# Patient Record
Sex: FEMALE | Race: WHITE | NOT HISPANIC OR LATINO | URBAN - METROPOLITAN AREA
[De-identification: names, ages, dates, MRNs, and addresses within clinical notes are randomized per-mention and may not be internally consistent; named-entity substitution may affect disease eponyms.]

---

## 2018-02-06 ENCOUNTER — OFFICE VISIT CONVERTED (OUTPATIENT)
Dept: SURGERY | Facility: CLINIC | Age: 68
End: 2018-02-06
Attending: NURSE PRACTITIONER

## 2021-03-22 ENCOUNTER — OFFICE VISIT CONVERTED (OUTPATIENT)
Dept: PODIATRY | Facility: CLINIC | Age: 71
End: 2021-03-22
Attending: PODIATRIST

## 2021-04-07 ENCOUNTER — OFFICE VISIT CONVERTED (OUTPATIENT)
Dept: PODIATRY | Facility: CLINIC | Age: 71
End: 2021-04-07
Attending: PODIATRIST

## 2021-05-10 NOTE — H&P
History and Physical      Patient Name: Lavonne Desouza   Patient ID: [id number]   Sex: Female   YOB: 1950    Primary Care Provider: Duane Santiago   Referring Provider: Duane Santiago    Visit Date: March 22, 2021    Provider: Kenny Reyes DPM   Location: [facility]   Location Address: [address] [place] [address]  [id number]   Location Phone: [phone]          Chief Complaint  · Right Foot Pain      History Of Present Illness  Lavonne Desouza is a 70 year old /White female who presents to the [facility] today new patient referred from Duane Santiago.      New, Established, New Problem:  new  Location:  Right heel  Duration:  Fall 2020  Onset:  gradual  Nature:  achy, sharp, shooting  Stable, worsening, improving:  worsening  Aggravating factors:    Patient describes morning Right heel pain as stabbing, burning, or aching. This pain usually subsides throughout the day, however it returns afterperiods of rest andsitting, when standing back up on their feet,and again the next morning.    Previous Treatment:  cortisone injection by PCP    Patient denies any fevers, chills, nausea, vomiting, shortness of breathe, nor any other constitutional signs nor symptoms.    Patient states that they work as an  for [employer].         Past Medical History  Allergic rhinitis, chronic; Bladder disorder; Heel pain, bilateral; Heel spur; Hyperthyroidism; Hypothyroidism         Past Surgical History  Appendectomy; Back surgery; cardiac stents; Gallbladder; Hernia; Hysterectomy-Abdominal; Parathyroidectomy         Medication List  amlodipine 2.5 mg oral tablet; aspirin 81 mg oral tablet,chewable; atorvastatin 40 mg oral tablet; clopidogrel 75 mg oral tablet; esomeprazole magnesium 40 mg oral capsule,delayed release(DR/EC); melatonin 10 mg oral tablet; ranolazine 500 mg oral tablet extended release 12 hr; Vitamin D3 50 mcg (2,000 unit) oral  "capsule         Allergy List  Codeine Sulfate; Egg allergy       Allergies Reconciled  Family Medical History  Heart Disease; Diabetes, unspecified type; Prostate cancer         Social History  Alcohol (Current some day); Caffeine (Current every day); Second hand smoke exposure (Current some day); Tobacco (Current some day)         Review of Systems  · Constitutional  o Denies  o : fatigue, night sweats  · Eyes  o Denies  o : double vision, blurred vision  · HENT  o Denies  o : vertigo, recent head injury  · Cardiovascular  o Denies  o : chest pain, irregular heart beats  · Respiratory  o Denies  o : shortness of breath, productive cough  · Gastrointestinal  o Denies  o : nausea, vomiting  · Genitourinary  o Denies  o : dysuria, urinary retention  · Integument  o Denies  o : hair growth change, new skin lesions  · Neurologic  o Denies  o : altered mental status, seizures  · Musculoskeletal  o * See HPI  · Endocrine  o Denies  o : cold intolerance, heat intolerance  · Heme-Lymph  o Denies  o : petechiae, lymph node enlargement or tenderness  · Allergic-Immunologic  o Denies  o : frequent illnesses      Vitals  Date Time BP Position Site L\R Cuff Size HR RR TEMP (F) WT  HT  BMI kg/m2 BSA m2 O2 Sat FR L/min FiO2 HC       03/22/2021 03:42 /47 Sitting    62 - R   160lbs 0oz 5'  1\" 30.23 1.77 97 %            Physical Examination  · Constitutional  o Appearance  o : Awake, alert, well developed, well nourished and well groomed  · Cardiovascular  o Peripheral Vascular System  o :   § Pedal Pulses  § : Pedal Pulses are +2 and symmetrical   § Extremities  § : No edema of the lower extremities  · Skin and Subcutaneous Tissue  o General Inspection  o : Skin is noted to have normal texture and turgor, with no excresences noted.  o Digits and Nails  o : The tonails are noted to be without disease.  · Neurologic  o Sensation  o : Epicritic sensations intact bilaterally.  · Right Ankle/Foot  o Inspection  o : Positive " tenderness to palpaiton to the medial tubercle of the calcaneus. No signs of edema, erythema, lymphangitis, nor signs of infection.   · Injection Note/Aspiration Note  o Site  o : right heel  o Procedure  o : This patient presents for a trigger point injection. I have discussed the nature, risks, benefits, alternatives and limitations of this procedure with this patient and obtained informed consent. The area was sterilely prepped with isopropyl alcohol. A 27 gauge, 1.25 inch needle was inserted iinto the affected area. A sterile dressing was applied to the area.  o Medication  o : 0.5ml of 1% lidocaine plain, 0.5ml of 40mg/ml Kenalog, and 0.5ml of 4mg/ml Dexamethasone   o Disposition  o : The patient tolerated the procedure well     Reviewed x-ray results:  November 2020.  Inferior calcaneal spurring reported.           Assessment  · Foot pain, right     729.5/M79.671  · Plantar fascial fibromatosis of right foot     728.71/M72.2      Plan  · Orders  o Decadron 4 mg/1cc Injection () - 729.5/M79.671, 728.71/M72.2 - 03/22/2021   Injection - Dexamethasone 4mg/mL; Dose: 2 mg; Site: Not Entered; Route: subcutaneous; Date: 03/22/2021 16:29:46; Exp: 11/30/2022; Lot: [id number]; Mfg: [employer]; TradeName: dexamethasone sodium phosphate; Location: [facility]; Administered By: Kenny Reyes DPM; Comment: ndc 3161-7089-62 inj site right foot  o 2.00 - Kenalog Injection 20mg (-3) - 729.5/M79.671, 728.71/M72.2 - 03/22/2021   Injection - Kenalog 20 mg; Dose: 20mg; Site: Not Entered; Route: subcutaneous; Date: 03/22/2021 16:30:35; Exp: 11/30/2021; Lot: [id number]; Mfg: [employer].; TradeName: triamcinolone acetonide; Location: [facility]; Administered By: Kenny Reyes DPM; Comment: ndc 0383-8478-97 inj site right foot  o Inj Tendon Sheath Or Ligament (20550) - 729.5/M79.671, 728.71/M72.2 - 03/22/2021  · Medications  o Medications have been Reconciled  o Transition of Care or Provider  Policy  · Instructions  o Handouts provided regarding Planter Fascitis.  o Overview: This patient has a plantar fasciitis.  o Discuss Findings: I have discussed the findings of this evaluation with the patient. The discussion included a complete verbal explanation of any changes in the examination results, diagnosis, and the current treatment plan. A schedule for future care needs was explained. If any questions should arise after returning home, I have encouraged the patient to feel free to contact Dr. Reyes. The patient states understanding and agreement with this plan.  o Monitor For Problems: Pt to monitor for problems and to contact Dr. Reyes for follow-up should such signs occur. Patient states understanding and agreement with this plan.  o Spenco: Recommend OTC Spenco Orthotics.  o Treatement Alternatives: Nonsurgical treatments almost always improve the pain. Treatment can last from several months to 2 years before symptoms get better. Most patients feel better in 9 months. Rarely Some people need surgery to relieve the pain.  o Conservative Treatment Recommendations: Anti-inflammatory medication to reduce pain and inflammation, Heel stretching exercises,   o Follow Up in 2 weeks. Injection f/u.  o Electronically Identified Patient Education Materials Provided Electronically  · Disposition  o Call or Return if symptoms worsen or persist.            Electronically Signed by: Kenny Reyes DPM -Author on March 22, 2021 05:02:00 PM

## 2021-05-14 VITALS
HEART RATE: 62 BPM | BODY MASS INDEX: 30.21 KG/M2 | HEIGHT: 61 IN | SYSTOLIC BLOOD PRESSURE: 120 MMHG | DIASTOLIC BLOOD PRESSURE: 47 MMHG | WEIGHT: 160 LBS | OXYGEN SATURATION: 97 %

## 2021-05-14 VITALS
OXYGEN SATURATION: 99 % | DIASTOLIC BLOOD PRESSURE: 40 MMHG | BODY MASS INDEX: 29.45 KG/M2 | WEIGHT: 156 LBS | HEIGHT: 61 IN | HEART RATE: 66 BPM | SYSTOLIC BLOOD PRESSURE: 131 MMHG | TEMPERATURE: 97.1 F

## 2021-05-14 NOTE — PROGRESS NOTES
Progress Note      Patient Name: Lavonne Desouza   Patient ID: [id number]   Sex: Female   YOB: 1950    Primary Care Provider: Duane Santiago   Referring Provider: Duane Santiago    Visit Date: April 7, 2021    Provider: Kenny Reyes DPM   Location: [facility]   Location Address: [address] [place] [address]  [id number]   Location Phone: [phone]          Chief Complaint  · Right Foot Pain      History Of Present Illness  Lavonne Desouza is a 70 year old /White female who presents to the [facility] today new patient referred from Duane Santiago.      New, Established, New Problem:  new  Location:  Right heel  Duration:  Fall 2020  Onset:  gradual  Nature:  achy, sharp, shooting  Stable, worsening, improving:  improving but still hurts.  Request another cortisone injection  Aggravating factors:    Patient describes morning Right heel pain as stabbing, burning, or aching. This pain usually subsides throughout the day, however it returns afterperiods of rest andsitting, when standing back up on their feet,and again the next morning.    Previous Treatment:  cortisone injection by PCP and my [employer] orthotics.    Patient denies any fevers, chills, nausea, vomiting, shortness of breathe, nor any other constitutional signs nor symptoms.    Patient states that they work as an  for [employer].         Past Medical History  Allergic rhinitis, chronic; Bladder disorder; Heel pain, bilateral; Heel spur; Hyperthyroidism; Hypothyroidism         Past Surgical History  Appendectomy; Back surgery; cardiac stents; Gallbladder; Hernia; Hysterectomy-Abdominal; Parathyroidectomy         Medication List  amlodipine 2.5 mg oral tablet; aspirin 81 mg oral tablet,chewable; atorvastatin 40 mg oral tablet; clopidogrel 75 mg oral tablet; esomeprazole magnesium 40 mg oral capsule,delayed release(DR/EC); melatonin 10 mg oral tablet; ranolazine  "500 mg oral tablet extended release 12 hr; Vitamin D3 50 mcg (2,000 unit) oral capsule         Allergy List  Codeine Sulfate; Egg allergy       Allergies Reconciled  Family Medical History  Heart Disease; Diabetes, unspecified type; Prostate cancer         Social History  Alcohol (Current some day); Caffeine (Current every day); Second hand smoke exposure (Current some day); Tobacco (Current some day)         Review of Systems  · Constitutional  o Denies  o : fatigue, night sweats  · Eyes  o Denies  o : double vision, blurred vision  · HENT  o Denies  o : vertigo, recent head injury  · Cardiovascular  o Denies  o : chest pain, irregular heart beats  · Respiratory  o Denies  o : shortness of breath, productive cough  · Gastrointestinal  o Denies  o : nausea, vomiting  · Genitourinary  o Denies  o : dysuria, urinary retention  · Integument  o Denies  o : hair growth change, new skin lesions  · Neurologic  o Denies  o : altered mental status, seizures  · Musculoskeletal  o * See HPI  · Endocrine  o Denies  o : cold intolerance, heat intolerance  · Heme-Lymph  o Denies  o : petechiae, lymph node enlargement or tenderness  · Allergic-Immunologic  o Denies  o : frequent illnesses      Vitals  Date Time BP Position Site L\R Cuff Size HR RR TEMP (F) WT  HT  BMI kg/m2 BSA m2 O2 Sat FR L/min FiO2 HC       04/07/2021 03:12 /40 Sitting    66 - R  97.1 155lbs 16oz 5'  1\" 29.48 1.75 99 %            Physical Examination  · Constitutional  o Appearance  o : Awake, alert, well developed, well nourished and well groomed  · Cardiovascular  o Peripheral Vascular System  o :   § Pedal Pulses  § : Pedal Pulses are +2 and symmetrical   § Extremities  § : No edema of the lower extremities  · Skin and Subcutaneous Tissue  o General Inspection  o : Skin is noted to have normal texture and turgor, with no excresences noted.  o Digits and Nails  o : The tonails are noted to be without disease.  · Neurologic  o Sensation  o : Epicritic " sensations intact bilaterally.  · Right Ankle/Foot  o Inspection  o : Positive tenderness to palpaiton to the medial tubercle of the calcaneus. No signs of edema, erythema, lymphangitis, nor signs of infection.   · Injection Note/Aspiration Note  o Site  o : right heel  o Procedure  o : This patient presents for a trigger point injection. I have discussed the nature, risks, benefits, alternatives and limitations of this procedure with this patient and obtained informed consent. The area was sterilely prepped with isopropyl alcohol. A 27 gauge, 1.25 inch needle was inserted iinto the affected area. A sterile dressing was applied to the area.  o Medication  o : 0.5ml of 1% lidocaine plain, 0.5ml of 40mg/ml Kenalog, and 0.5ml of 4mg/ml Dexamethasone   o Disposition  o : The patient tolerated the procedure well          Assessment  · Foot pain, right     729.5/M79.671  · Plantar fascial fibromatosis of right foot     728.71/M72.2      Plan  · Orders  o Decadron 4 mg/1cc Injection () - 729.5/M79.671, 728.71/M72.2 - 04/07/2021   Injection - Dexamethasone 4mg/mL; Dose: 2 mg; Site: Not Entered; Route: subcutaneous; Date: 04/07/2021 15:31:55; Exp: 11/30/2022; Lot: [id number]; Mfg: [employer]; TradeName: dexamethasone sodium phosphate; Location: [facility]; Administered By: Kenny Reyes DPM; Comment: NDC 2602-4147-40 Injection site right foot  o 2.00 - Kenalog Injection 20mg (-3) - 729.5/M79.671, 728.71/M72.2 - 04/07/2021   Injection - Kenalog 20 mg; Dose: 20mg; Site: Not Entered; Route: subcutaneous; Date: 04/07/2021 15:32:42; Exp: 11/30/2021; Lot: [id number]; Mfg: [employer].; TradeName: triamcinolone acetonide; Location: [facility]; Administered By: Kenny Reyes DPM; Comment: NDC 7042-5333-61 Injection site right foot  o Inj Tendon Sheath Or Ligament (20550) - 729.5/M79.671, 728.71/M72.2 - 04/07/2021  · Medications  o Medications have been Reconciled  o Transition of Care or Provider  Policy  · Instructions  o Overview: This patient has a plantar fasciitis.  o Discuss Findings: I have discussed the findings of this evaluation with the patient. The discussion included a complete verbal explanation of any changes in the examination results, diagnosis, and the current treatment plan. A schedule for future care needs was explained. If any questions should arise after returning home, I have encouraged the patient to feel free to contact Dr. Reyes. The patient states understanding and agreement with this plan.  o Monitor For Problems: Pt to monitor for problems and to contact Dr. Reyes for follow-up should such signs occur. Patient states understanding and agreement with this plan.  o Treatement Alternatives: Nonsurgical treatments almost always improve the pain. Treatment can last from several months to 2 years before symptoms get better. Most patients feel better in 9 months. Rarely Some people need surgery to relieve the pain.  o Conservative Treatment Recommendations: Anti-inflammatory medication to reduce pain and inflammation, Heel stretching exercises,   o Patient request PRN follow-up.  o Patient may begin to weight bear as tolerated in supportive shoes. No impact activities for two weeks. After that time, the patient may increase activities as tolerated. Patient states understanding and agreement with this plan.  o Electronically Identified Patient Education Materials Provided Electronically  · Disposition  o Call or Return if symptoms worsen or persist.            Electronically Signed by: Kenny Reyes DPM -Author on April 8, 2021 09:09:05 AM

## 2021-05-16 VITALS — RESPIRATION RATE: 16 BRPM | HEIGHT: 61 IN | WEIGHT: 162 LBS | BODY MASS INDEX: 30.58 KG/M2
